# Patient Record
Sex: FEMALE
[De-identification: names, ages, dates, MRNs, and addresses within clinical notes are randomized per-mention and may not be internally consistent; named-entity substitution may affect disease eponyms.]

---

## 2024-01-30 PROBLEM — Z00.00 ENCOUNTER FOR PREVENTIVE HEALTH EXAMINATION: Status: ACTIVE | Noted: 2024-01-30

## 2024-01-30 PROBLEM — B20 HIV (HUMAN IMMUNODEFICIENCY VIRUS INFECTION): Status: RESOLVED | Noted: 2024-01-30 | Resolved: 2024-01-30

## 2024-01-30 PROBLEM — Z78.9 NON-SMOKER: Status: ACTIVE | Noted: 2024-01-30

## 2024-01-30 RX ORDER — ESTRADIOL 10 UG/1
TABLET, FILM COATED VAGINAL
Refills: 0 | Status: ACTIVE | COMMUNITY

## 2024-01-30 RX ORDER — BICTEGRAVIR SODIUM, EMTRICITABINE, AND TENOFOVIR ALAFENAMIDE FUMARATE 30; 120; 15 MG/1; MG/1; MG/1
TABLET ORAL
Refills: 0 | Status: ACTIVE | COMMUNITY

## 2024-01-30 RX ORDER — SPIRONOLACTONE 50 MG/1
TABLET ORAL
Refills: 0 | Status: ACTIVE | COMMUNITY

## 2024-02-01 ENCOUNTER — APPOINTMENT (OUTPATIENT)
Dept: PLASTIC SURGERY | Facility: CLINIC | Age: 29
End: 2024-02-01
Payer: COMMERCIAL

## 2024-02-01 VITALS
HEIGHT: 72 IN | HEART RATE: 90 BPM | OXYGEN SATURATION: 99 % | SYSTOLIC BLOOD PRESSURE: 137 MMHG | DIASTOLIC BLOOD PRESSURE: 81 MMHG | BODY MASS INDEX: 28.61 KG/M2 | WEIGHT: 211.2 LBS

## 2024-02-01 DIAGNOSIS — B20 HUMAN IMMUNODEFICIENCY VIRUS [HIV] DISEASE: ICD-10-CM

## 2024-02-01 DIAGNOSIS — Z78.9 OTHER SPECIFIED HEALTH STATUS: ICD-10-CM

## 2024-02-01 PROCEDURE — 99205 OFFICE O/P NEW HI 60 MIN: CPT

## 2024-03-08 NOTE — HISTORY OF PRESENT ILLNESS
[FreeTextEntry1] : 29yo woman designated male at birth (Germaine, she/her) presenting for discussion on breast augmentation. She states she doesn't know what bra size she wears and wishes to have "650cc implants." She is not concerned about having an augmented look. She has been on medically supervised feminizing hormones for 2.5 years but was on hormones prior for longer. She has not noticed any additional breast growth in the last several months. She denies any lumps, bumps, or other recent changes in her breasts. She has not had any breast imaging. She denies a family history of breast, ovarian, and pancreatic cancer. She denies any family or personal history of blood clots. She states that nipple sensation is important to her (4 out of 5 importance). She has no plans to breast feed.  Of note, she underwent a 2-stage FFS this past September and December. Doing well.  Patient is currently unemployed. Lives alone as of a few days ago. She has 2-3 friends that are planning to support her after surgery. Denies tobacco, alcohol, and any other recreational drug use. Patient denies any history of psychiatric hospitalization or admission in the past two years.

## 2024-03-08 NOTE — ASSESSMENT
[FreeTextEntry1] : The patient is specifically interested in SC* silicone style implants.  Upon reviewing the bra sizers.  She is interested in augmentation between finder and 550 and 600 cc.  She is potentially interested in higher profile implants.  Would recommend subfascial placement in this patient.  If she is interested in proceeding, we will need 2 letters of assessment and another appointment for review of the patient decision checklist. She will also need to provide a recent HIV viral load and CD4 count in the past 3 months.  She will need to be recovered from FFS for at least 6 months prior to surgery.

## 2024-03-08 NOTE — PHYSICAL EXAM
[de-identified] : NAD. BMI 28.6 [de-identified] : Normal respiratory effort. [de-identified] : Breast exam was performed with a medical chaperone present (DM). No dominant masses, skin changes, nipple retraction, or palpable axillary lymphadenopathy. SN->N distance is 23.5 cm on the left and 24 cm on the right; width is 14 cm (ideal ~ 17 cm) on the left and 15 cm (ideal ~ 17 cm) on the right; N->IMF is 9 cm on the left and 9 cm on the right.

## 2024-03-08 NOTE — ADDENDUM
[FreeTextEntry1] : 2024-03-08 The patient has an acceptable viral load of 48 and a CD4 count of 378. We are awaiting her letters of assessment and another appointment for review of the patient decision checklist.

## 2024-03-18 PROBLEM — F64.0 GENDER DYSPHORIA IN ADULT: Status: ACTIVE | Noted: 2024-01-30

## 2024-03-21 ENCOUNTER — APPOINTMENT (OUTPATIENT)
Dept: PLASTIC SURGERY | Facility: CLINIC | Age: 29
End: 2024-03-21
Payer: COMMERCIAL

## 2024-03-21 VITALS — SYSTOLIC BLOOD PRESSURE: 125 MMHG | DIASTOLIC BLOOD PRESSURE: 84 MMHG | HEART RATE: 94 BPM | OXYGEN SATURATION: 100 %

## 2024-03-21 DIAGNOSIS — F64.0 TRANSSEXUALISM: ICD-10-CM

## 2024-03-21 PROCEDURE — 99214 OFFICE O/P EST MOD 30 MIN: CPT

## 2024-03-21 NOTE — REASON FOR VISIT
[Follow-Up: _____] : a [unfilled] follow-up visit [FreeTextEntry1] : return consultation for breast augmentation

## 2024-03-21 NOTE — HISTORY OF PRESENT ILLNESS
[FreeTextEntry1] : The patient returns for implant sizing and discussion of the patient decision checklist. Patient remains interested in SC* silicone style implants. Upon reviewing the bra sizers, she is interested in augmentation between 550 and 600 cc, based on bra sizers. She is potentially interested in higher profile implants. Would recommend subfascial placement in this patient.

## 2024-03-21 NOTE — ASSESSMENT
[FreeTextEntry1] : After review of implant sizers, the patient expresses preference for 550-600 cc, she expresses understand that we may not be able to go that big. 500-600 cc implants will be ordered. She remains interested in SC* style silicone implants. We reviewed, in detail, the patient decision checklist for Natrelle silicone filled implants. The patient expressed that she had no other questions.  The patient has an acceptable viral load of 48 and a CD4 count of 378.   She has a mental health letter of assessment from Humble Calderon LCSW, and a letter of assessment from Marshall Hitchcock MD.  We will submit for insurance authorization.

## 2024-07-03 ENCOUNTER — APPOINTMENT (OUTPATIENT)
Dept: PLASTIC SURGERY | Facility: CLINIC | Age: 29
End: 2024-07-03

## 2024-07-10 ENCOUNTER — NON-APPOINTMENT (OUTPATIENT)
Age: 29
End: 2024-07-10

## 2024-07-10 RX ORDER — OXYCODONE 5 MG/1
5 TABLET ORAL EVERY 6 HOURS
Qty: 10 | Refills: 0 | Status: ACTIVE | COMMUNITY
Start: 2024-07-10 | End: 1900-01-01

## 2024-07-15 RX ORDER — ACETAMINOPHEN 325 MG
1000 TABLET ORAL ONCE
Refills: 0 | Status: COMPLETED | OUTPATIENT
Start: 2024-07-16 | End: 2024-07-16

## 2024-07-15 RX ORDER — APREPITANT 125MG-80MG
40 KIT ORAL ONCE
Refills: 0 | Status: COMPLETED | OUTPATIENT
Start: 2024-07-16 | End: 2024-07-16

## 2024-07-15 NOTE — ASU PATIENT PROFILE, ADULT - ABILITY TO HEAR (WITH HEARING AID OR HEARING APPLIANCE IF NORMALLY USED):
Adequate: hears normal conversation without difficulty Clindamycin Pregnancy And Lactation Text: This medication can be used in pregnancy if certain situations. Clindamycin is also present in breast milk.

## 2024-07-15 NOTE — ASU PATIENT PROFILE, ADULT - NS PREOP UNDERSTANDS INFO
Bring photo ID/insurance/credit cards .No solid food for before midnight surgery/no chewing gums or hard candy while fasting. Clears till 06:00 AM. Wear loose comfortable fitting clothes/ no lotion/perfume/ jewelry or make up. Address and phone number given. Must have an escort./yes

## 2024-07-15 NOTE — ASU PATIENT PROFILE, ADULT - FALL HARM RISK - UNIVERSAL INTERVENTIONS
Bed in lowest position, wheels locked, appropriate side rails in place/Call bell, personal items and telephone in reach/Instruct patient to call for assistance before getting out of bed or chair/Non-slip footwear when patient is out of bed/[place] to call system/Physically safe environment - no spills, clutter or unnecessary equipment/Purposeful Proactive Rounding/Room/bathroom lighting operational, light cord in reach

## 2024-07-15 NOTE — ASU PATIENT PROFILE, ADULT - NSICDXPASTSURGICALHX_GEN_ALL_CORE_FT
PAST SURGICAL HISTORY:  H/O rhinoplasty     History of facelift seminization multiple times     PAST SURGICAL HISTORY:  H/O rhinoplasty     History of facelift feminization multiple times

## 2024-07-16 ENCOUNTER — APPOINTMENT (OUTPATIENT)
Dept: PLASTIC SURGERY | Facility: AMBULATORY SURGERY CENTER | Age: 29
End: 2024-07-16

## 2024-07-16 ENCOUNTER — OUTPATIENT (OUTPATIENT)
Dept: OUTPATIENT SERVICES | Facility: HOSPITAL | Age: 29
LOS: 1 days | Discharge: HOME | End: 2024-07-16

## 2024-07-16 ENCOUNTER — TRANSCRIPTION ENCOUNTER (OUTPATIENT)
Age: 29
End: 2024-07-16

## 2024-07-16 VITALS
OXYGEN SATURATION: 99 % | RESPIRATION RATE: 14 BRPM | SYSTOLIC BLOOD PRESSURE: 123 MMHG | DIASTOLIC BLOOD PRESSURE: 78 MMHG | TEMPERATURE: 97 F | WEIGHT: 220.68 LBS | HEIGHT: 72 IN | HEART RATE: 77 BPM

## 2024-07-16 VITALS
DIASTOLIC BLOOD PRESSURE: 77 MMHG | HEART RATE: 84 BPM | SYSTOLIC BLOOD PRESSURE: 128 MMHG | RESPIRATION RATE: 16 BRPM | OXYGEN SATURATION: 95 %

## 2024-07-16 DIAGNOSIS — Z98.890 OTHER SPECIFIED POSTPROCEDURAL STATES: Chronic | ICD-10-CM

## 2024-07-16 PROCEDURE — 19325 BREAST AUGMENTATION W/IMPLT: CPT | Mod: 50

## 2024-07-16 DEVICE — IMPLANTABLE DEVICE: Type: IMPLANTABLE DEVICE | Status: FUNCTIONAL

## 2024-07-16 RX ORDER — FENTANYL CITRATE 50 UG/ML
25 INJECTION, SOLUTION INTRAMUSCULAR; INTRAVENOUS
Refills: 0 | Status: DISCONTINUED | OUTPATIENT
Start: 2024-07-16 | End: 2024-07-16

## 2024-07-16 RX ORDER — DEXTROSE MONOHYDRATE AND SODIUM CHLORIDE 5; .3 G/100ML; G/100ML
1000 INJECTION, SOLUTION INTRAVENOUS
Refills: 0 | Status: DISCONTINUED | OUTPATIENT
Start: 2024-07-16 | End: 2024-07-16

## 2024-07-16 RX ADMIN — APREPITANT 40 MILLIGRAM(S): KIT at 07:36

## 2024-07-16 RX ADMIN — Medication 1 APPLICATION(S): at 07:37

## 2024-07-16 RX ADMIN — Medication 1000 MILLIGRAM(S): at 07:35

## 2024-07-16 NOTE — ASU DISCHARGE PLAN (ADULT/PEDIATRIC) - NS MD DC FALL RISK RISK
For information on Fall & Injury Prevention, visit: [email or web address] OR  [email or web address] OR  https://www.cdc.gov/steadi/patient.html
0 = independent

## 2024-07-16 NOTE — ASU DISCHARGE PLAN (ADULT/PEDIATRIC) - CARE PROVIDER_API CALL
Rajan Erickson  Plastic Surgery  [address], [address]  [place] [address]  Phone: [phone]  Fax: [phone]  Follow Up Time:

## 2024-07-16 NOTE — PRE-ANESTHESIA EVALUATION ADULT - NSANTHOSAYNRD_GEN_A_CORE
No. LIBBY screening performed.  STOP BANG Legend: 0-2 = LOW Risk; 3-4 = INTERMEDIATE Risk; 5-8 = HIGH Risk

## 2024-07-18 ENCOUNTER — TRANSCRIPTION ENCOUNTER (OUTPATIENT)
Age: 29
End: 2024-07-18

## 2024-07-22 RX ORDER — BICTEGRAVIR SODIUM, EMTRICITABINE, AND TENOFOVIR ALAFENAMIDE FUMARATE 30; 120; 15 MG/1; MG/1; MG/1
1 TABLET ORAL
Refills: 0 | DISCHARGE

## 2024-07-22 RX ORDER — ESTRADIOL 1 MG/1
1 TABLET ORAL
Refills: 0 | DISCHARGE

## 2024-07-24 ENCOUNTER — APPOINTMENT (OUTPATIENT)
Dept: PLASTIC SURGERY | Facility: CLINIC | Age: 29
End: 2024-07-24
Payer: COMMERCIAL

## 2024-07-24 DIAGNOSIS — F64.0 TRANSSEXUALISM: ICD-10-CM

## 2024-07-24 PROBLEM — B20 HUMAN IMMUNODEFICIENCY VIRUS [HIV] DISEASE: Chronic | Status: ACTIVE | Noted: 2024-07-15

## 2024-07-24 PROCEDURE — 99024 POSTOP FOLLOW-UP VISIT: CPT

## 2024-07-24 NOTE — HISTORY OF PRESENT ILLNESS
[FreeTextEntry1] : The patient returns 8 days following bilateral breast augmentation. She denies significant discomfort.

## 2024-07-24 NOTE — REASON FOR VISIT
[Post Op: _________] : a [unfilled] post op visit [FreeTextEntry1] : Dos: 07/16/2024; S/P: bilateral breast augmentation with implants

## 2024-07-24 NOTE — ASSESSMENT
[FreeTextEntry1] : No evidence of infection or collection. Wound care, activity restrictions, need for support were reviewed. Follow-up in 2-3 weeks for reassessment.

## 2024-07-24 NOTE — PHYSICAL EXAM
[de-identified] : Breast exam performed with a medical chaperone present (DM). Dressings removed. Good overall symmetry. No capsular contracture. Incisions intact. Steri-strips removed and replaced. No significant areas of fullness, fluctuance, erythema, or ecchymosis.

## 2024-07-24 NOTE — PHYSICAL EXAM
[de-identified] : Breast exam performed with a medical chaperone present (DM). Dressings removed. Good overall symmetry. No capsular contracture. Incisions intact. Steri-strips removed and replaced. No significant areas of fullness, fluctuance, erythema, or ecchymosis.

## 2024-08-15 ENCOUNTER — APPOINTMENT (OUTPATIENT)
Dept: PLASTIC SURGERY | Facility: CLINIC | Age: 29
End: 2024-08-15
Payer: COMMERCIAL

## 2024-08-15 VITALS
BODY MASS INDEX: 28.58 KG/M2 | SYSTOLIC BLOOD PRESSURE: 135 MMHG | HEIGHT: 72 IN | RESPIRATION RATE: 16 BRPM | DIASTOLIC BLOOD PRESSURE: 86 MMHG | HEART RATE: 83 BPM | OXYGEN SATURATION: 97 % | WEIGHT: 211 LBS

## 2024-08-15 DIAGNOSIS — F64.0 TRANSSEXUALISM: ICD-10-CM

## 2024-08-15 PROCEDURE — 99024 POSTOP FOLLOW-UP VISIT: CPT

## 2024-08-15 NOTE — PHYSICAL EXAM
[de-identified] : Breast exam performed with a medical chaperone present (DM). Good overall symmetry; implant position appropriate. Incisions healing well without erythema or tenderness.

## 2024-08-15 NOTE — REASON FOR VISIT
[Post Op: _________] : a [unfilled] post op visit [FreeTextEntry1] : Dos: 07/16/2024; S/P: bilateral breast augmentation with implants.

## 2024-08-15 NOTE — PHYSICAL EXAM
[de-identified] : Breast exam performed with a medical chaperone present (DM). Good overall symmetry; implant position appropriate. Incisions healing well without erythema or tenderness.

## 2024-08-15 NOTE — HISTORY OF PRESENT ILLNESS
[FreeTextEntry1] : The patient returns 4 weeks following breast augmentation. She denies any significant issues. She states she is happy with her result.

## (undated) DEVICE — DRSG COMBINE 5X9"

## (undated) DEVICE — SUT VICRYL 3-0 18" PS-2 UNDYED

## (undated) DEVICE — DRAPE MAGNETIC INSTRUMENT MEDIUM

## (undated) DEVICE — SUT PDS II 2-0 27" SH

## (undated) DEVICE — PREP CHLORAPREP HI-LITE ORANGE 26ML

## (undated) DEVICE — DRSG STERISTRIPS 0.5 X 4"

## (undated) DEVICE — DRSG MASTISOL

## (undated) DEVICE — WOUND IRR IRRISEPT W 0.5 CHG

## (undated) DEVICE — GLV 7.5 PROTEXIS (WHITE)

## (undated) DEVICE — MEE-ESU VALLEYLAB T2J57287DX: Type: DURABLE MEDICAL EQUIPMENT

## (undated) DEVICE — DRSG TELFA 3 X 8

## (undated) DEVICE — WARMING BLANKET LOWER ADULT

## (undated) DEVICE — VENODYNE/SCD SLEEVE CALF MEDIUM

## (undated) DEVICE — ELCTR STRYKER NEPTUNE SMOKE EVACUATION PENCIL (GREEN)

## (undated) DEVICE — DRSG GAUZE FLUFF 1PLY 18X30"

## (undated) DEVICE — KELLER FUNNEL

## (undated) DEVICE — DRAPE MEDIUM SHEET 44" X 70"

## (undated) DEVICE — SUT ETHILON 4-0 18" FS-2

## (undated) DEVICE — DRAPE SPLIT SHEET 77" X 108"

## (undated) DEVICE — SUT VICRYL 2-0 27" SH UNDYED

## (undated) DEVICE — GLV 8 PROTEXIS (WHITE)

## (undated) DEVICE — SUT MONOCRYL 4-0 18" P-3 UNDYED

## (undated) DEVICE — DRAPE IOBAN 23" X 23"

## (undated) DEVICE — DRSG TEGADERM 4X4.75"

## (undated) DEVICE — ONETRAC LIGHTED RETRACTOR 90 X 22MM DISP

## (undated) DEVICE — SYR ASEPTO

## (undated) DEVICE — ELCTR STRYKER EXTENSION SUCTION TIP 165MM

## (undated) DEVICE — ELCTR STRYKER NEPTUNE BLADE COATED, INSULATED 70MM

## (undated) DEVICE — ELCTR STRYKER BLADE COATED INSULATED 165MM

## (undated) DEVICE — LAP PAD 12 X 12"

## (undated) DEVICE — DRSG SURGICAL BRA LG 38-40

## (undated) DEVICE — PACK BREAST

## (undated) DEVICE — MARKING PEN W RULER

## (undated) DEVICE — POSITIONER PINK PAD PIGAZZI SYSTEM XL W ARM PROTECTOR